# Patient Record
(demographics unavailable — no encounter records)

---

## 2024-10-28 NOTE — PHYSICAL EXAM
[Healthy Appearing] : healthy appearing [Well Nourished] : well nourished [Interactive] : interactive [Normal Appearance] : normal appearance [Well formed] : well formed [Normally Set] : normally set [Normal S1 and S2] : normal S1 and S2 [Clear to Ausculation Bilaterally] : clear to auscultation bilaterally [Abdomen Soft] : soft [Abdomen Tenderness] : non-tender [] : no hepatosplenomegaly [3] : was Davis stage 3 [Scant] : scant [___] : [unfilled] [Normal] : normal  [Murmur] : no murmurs [FreeTextEntry2] : Penile length of 7 cm

## 2024-10-28 NOTE — PAST MEDICAL HISTORY
[At Term] : at term [ Section] : by  section [None] : there were no delivery complications [Age Appropriate] : age appropriate developmental milestones met [FreeTextEntry1] : 7lb

## 2024-10-28 NOTE — DISCUSSION/SUMMARY
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 A review of his growth chart shows that he is growing at the 10th percentile for height, toward his midparental target height of 66.5 inches 4 inches. He is currently growing at the 20th percentile for height towards a projected height of 68 inches He has a history of ADHD and has been on treatment since age 8 years.  Our assessment also showed increased pubertal maturation given that his testicular volume has increased from 12 to 15 cc.   PLAN:  1.  Continue on current dose of GH His parent was satisfied with the explanation and the conduct of the visit

## 2024-10-28 NOTE — CONSULT LETTER
[Dear  ___] : Dear  [unfilled], [Consult Letter:] : I had the pleasure of evaluating your patient, [unfilled]. [Please see my note below.] : Please see my note below. [Consult Closing:] : Thank you very much for allowing me to participate in the care of this patient.  If you have any questions, please do not hesitate to contact me. [Sincerely,] : Sincerely, [FreeTextEntry3] : Harjit Lares M.D., FAAP.\par  Professor of Pediatrics, [facility] [employer] at [facility]\par  Chief of Endocrinology, [facility]\par  Director, [facility]\par  [address] [address]\par  Tel: [phone]; Fax: [phone]; Email: [email or web address] <[email or web address]>\par  \par  \par  \par

## 2025-05-05 NOTE — CONSULT LETTER
[Dear  ___] : Dear  [unfilled], [Consult Letter:] : I had the pleasure of evaluating your patient, [unfilled]. [Please see my note below.] : Please see my note below. [Consult Closing:] : Thank you very much for allowing me to participate in the care of this patient.  If you have any questions, please do not hesitate to contact me. [Sincerely,] : Sincerely, [FreeTextEntry3] : Harjit Lares M.D., FAAP.\par  Professor of Pediatrics, [facility] [employer] at [facility]\par  Chief of Endocrinology, [facility]\par  Director, [facility]\par  [address] [address]\par  Tel: [phone]; Fax: [phone]; Email: [email or web address] <[email or web address]>\par  \par  \par  \par

## 2025-05-05 NOTE — DISCUSSION/SUMMARY
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 A review of his growth chart shows that he is growing at the 10th percentile for height, toward his midparental target height (MPTH) of 66.5 inches 4 inches. Today, he is at the 24th %ile towards a projected height of 68 in He is currently growing at the 20th percentile for height towards a projected height of 68 inches He has a history of ADHD and has been on treatment since age 8 years.  Our assessment also showed increased pubertal maturation given that his testicular volume has increased from 12 to 15 cc.   PLAN:  1.  Continue on current dose of GH His parent was satisfied with the explanation and the conduct of the visit

## 2025-05-05 NOTE — PHYSICAL EXAM
PPI  HOB elevation [Healthy Appearing] : healthy appearing [Well Nourished] : well nourished [Interactive] : interactive [Normal Appearance] : normal appearance [Well formed] : well formed [Normally Set] : normally set [Normal S1 and S2] : normal S1 and S2 [Clear to Ausculation Bilaterally] : clear to auscultation bilaterally [Abdomen Soft] : soft [Abdomen Tenderness] : non-tender [] : no hepatosplenomegaly [3] : was Davis stage 3 [Scant] : scant [___] : [unfilled] [Normal] : normal  [Murmur] : no murmurs [FreeTextEntry2] : Penile length of 7 cm

## 2025-05-05 NOTE — CONSULT LETTER
[Dear  ___] : Dear  [unfilled], [Consult Letter:] : I had the pleasure of evaluating your patient, [unfilled]. [Please see my note below.] : Please see my note below. [Consult Closing:] : Thank you very much for allowing me to participate in the care of this patient.  If you have any questions, please do not hesitate to contact me. [Sincerely,] : Sincerely, [FreeTextEntry3] : Harjit Lares M.D., FAAP.\par  Professor of Pediatrics, [employer] at [facility]\par  Chief of Endocrinology, [facility]\par  Director, [facility]\par  [address] [address]\par  Tel: [phone]; Fax: [phone]; Email: [email or web address] <[email or web address]  \par  \par  \par